# Patient Record
Sex: MALE | Race: WHITE | Employment: OTHER | URBAN - NONMETROPOLITAN AREA
[De-identification: names, ages, dates, MRNs, and addresses within clinical notes are randomized per-mention and may not be internally consistent; named-entity substitution may affect disease eponyms.]

---

## 2018-09-11 ENCOUNTER — APPOINTMENT (OUTPATIENT)
Dept: CT IMAGING | Facility: HOSPITAL | Age: 63
End: 2018-09-11
Attending: FAMILY MEDICINE
Payer: COMMERCIAL

## 2018-09-11 ENCOUNTER — HOSPITAL ENCOUNTER (EMERGENCY)
Facility: HOSPITAL | Age: 63
Discharge: HOME | End: 2018-09-11
Attending: FAMILY MEDICINE | Admitting: FAMILY MEDICINE
Payer: COMMERCIAL

## 2018-09-11 ENCOUNTER — APPOINTMENT (OUTPATIENT)
Dept: MRI IMAGING | Facility: HOSPITAL | Age: 63
End: 2018-09-11
Attending: FAMILY MEDICINE
Payer: COMMERCIAL

## 2018-09-11 VITALS
RESPIRATION RATE: 18 BRPM | TEMPERATURE: 98 F | HEART RATE: 70 BPM | SYSTOLIC BLOOD PRESSURE: 156 MMHG | WEIGHT: 230 LBS | DIASTOLIC BLOOD PRESSURE: 100 MMHG | OXYGEN SATURATION: 96 %

## 2018-09-11 DIAGNOSIS — D32.0 BENIGN NEOPLASM OF CEREBRAL MENINGES (H): ICD-10-CM

## 2018-09-11 DIAGNOSIS — R42 VERTIGO: ICD-10-CM

## 2018-09-11 LAB
ANION GAP SERPL CALCULATED.3IONS-SCNC: 4 MMOL/L (ref 3–14)
BASOPHILS # BLD AUTO: 0 10E9/L (ref 0–0.2)
BASOPHILS NFR BLD AUTO: 0.5 %
BUN SERPL-MCNC: 23 MG/DL (ref 7–30)
CALCIUM SERPL-MCNC: 8.6 MG/DL (ref 8.5–10.1)
CHLORIDE SERPL-SCNC: 105 MMOL/L (ref 94–109)
CO2 SERPL-SCNC: 27 MMOL/L (ref 20–32)
CREAT SERPL-MCNC: 0.95 MG/DL (ref 0.66–1.25)
DIFFERENTIAL METHOD BLD: NORMAL
EOSINOPHIL # BLD AUTO: 0.2 10E9/L (ref 0–0.7)
EOSINOPHIL NFR BLD AUTO: 2.4 %
ERYTHROCYTE [DISTWIDTH] IN BLOOD BY AUTOMATED COUNT: 12.8 % (ref 10–15)
GFR SERPL CREATININE-BSD FRML MDRD: 80 ML/MIN/1.7M2
GLUCOSE SERPL-MCNC: 100 MG/DL (ref 70–99)
HCT VFR BLD AUTO: 47.3 % (ref 40–53)
HGB BLD-MCNC: 16.2 G/DL (ref 13.3–17.7)
IMM GRANULOCYTES # BLD: 0.1 10E9/L (ref 0–0.4)
IMM GRANULOCYTES NFR BLD: 0.9 %
LYMPHOCYTES # BLD AUTO: 1.4 10E9/L (ref 0.8–5.3)
LYMPHOCYTES NFR BLD AUTO: 22.7 %
MCH RBC QN AUTO: 29.7 PG (ref 26.5–33)
MCHC RBC AUTO-ENTMCNC: 34.2 G/DL (ref 31.5–36.5)
MCV RBC AUTO: 87 FL (ref 78–100)
MONOCYTES # BLD AUTO: 0.5 10E9/L (ref 0–1.3)
MONOCYTES NFR BLD AUTO: 8.2 %
NEUTROPHILS # BLD AUTO: 4.1 10E9/L (ref 1.6–8.3)
NEUTROPHILS NFR BLD AUTO: 65.3 %
NRBC # BLD AUTO: 0 10*3/UL
NRBC BLD AUTO-RTO: 0 /100
PLATELET # BLD AUTO: 212 10E9/L (ref 150–450)
POTASSIUM SERPL-SCNC: 4.1 MMOL/L (ref 3.4–5.3)
RBC # BLD AUTO: 5.45 10E12/L (ref 4.4–5.9)
SODIUM SERPL-SCNC: 136 MMOL/L (ref 133–144)
WBC # BLD AUTO: 6.3 10E9/L (ref 4–11)

## 2018-09-11 PROCEDURE — 70450 CT HEAD/BRAIN W/O DYE: CPT | Mod: TC

## 2018-09-11 PROCEDURE — A9585 GADOBUTROL INJECTION: HCPCS | Performed by: RADIOLOGY

## 2018-09-11 PROCEDURE — 70553 MRI BRAIN STEM W/O & W/DYE: CPT | Mod: TC

## 2018-09-11 PROCEDURE — 99285 EMERGENCY DEPT VISIT HI MDM: CPT | Mod: 25

## 2018-09-11 PROCEDURE — 99285 EMERGENCY DEPT VISIT HI MDM: CPT | Performed by: FAMILY MEDICINE

## 2018-09-11 PROCEDURE — 36415 COLL VENOUS BLD VENIPUNCTURE: CPT | Performed by: FAMILY MEDICINE

## 2018-09-11 PROCEDURE — 93005 ELECTROCARDIOGRAM TRACING: CPT

## 2018-09-11 PROCEDURE — 85025 COMPLETE CBC W/AUTO DIFF WBC: CPT | Performed by: FAMILY MEDICINE

## 2018-09-11 PROCEDURE — 80048 BASIC METABOLIC PNL TOTAL CA: CPT | Performed by: FAMILY MEDICINE

## 2018-09-11 PROCEDURE — 93010 ELECTROCARDIOGRAM REPORT: CPT | Performed by: INTERNAL MEDICINE

## 2018-09-11 PROCEDURE — 25500064 ZZH RX 255 OP 636: Performed by: RADIOLOGY

## 2018-09-11 RX ORDER — MULTIPLE VITAMINS W/ MINERALS TAB 9MG-400MCG
1 TAB ORAL DAILY
COMMUNITY

## 2018-09-11 RX ORDER — MECLIZINE HCL 12.5 MG 12.5 MG/1
12.5 TABLET ORAL 4 TIMES DAILY PRN
Qty: 30 TABLET | Refills: 0 | COMMUNITY
Start: 2018-09-11

## 2018-09-11 RX ORDER — GADOBUTROL 604.72 MG/ML
10 INJECTION INTRAVENOUS ONCE
Status: COMPLETED | OUTPATIENT
Start: 2018-09-11 | End: 2018-09-11

## 2018-09-11 RX ADMIN — GADOBUTROL 10 ML: 604.72 INJECTION INTRAVENOUS at 12:50

## 2018-09-11 ASSESSMENT — ENCOUNTER SYMPTOMS
FEVER: 0
PSYCHIATRIC NEGATIVE: 1
DIAPHORESIS: 0
SHORTNESS OF BREATH: 0
DYSURIA: 0
WEAKNESS: 0
DIARRHEA: 0
LIGHT-HEADEDNESS: 1
DIZZINESS: 1
SPEECH DIFFICULTY: 0
CONSTIPATION: 0
NAUSEA: 0
ACTIVITY CHANGE: 0
FATIGUE: 0
VOMITING: 0
ABDOMINAL PAIN: 0
MUSCULOSKELETAL NEGATIVE: 1

## 2018-09-11 NOTE — ED PROVIDER NOTES
"  History     Chief Complaint   Patient presents with     Dizziness     \"on and off since yesterday\"     HPI  Flo Pablo is a 63 year old male who presents the emergency room with complaints of dizziness on and off since yesterday morning when he got up.  He states that it was not spinning initially, just unsteady, but then when he tried to stretch he had some vertiginous movement.  He did fairly well through the day yesterday, occasional spells of the lightheadedness, but nothing constant.  This morning when he woke up it was worse again and he decided he needed to be seen and evaluated.  He is a  and did  game last night and did okay, however he is concerned.  He denies any headache or weakness in his extremities, he has no facial droop, is having no difficulty with speaking or word finding.    Problem List:    There are no active problems to display for this patient.       Past Medical History:    No past medical history on file.    Past Surgical History:    No past surgical history on file.    Family History:    No family history on file.    Social History:  Marital Status:   [2]  Social History   Substance Use Topics     Smoking status: Not on file     Smokeless tobacco: Not on file     Alcohol use Not on file        Medications:      ASPIRIN EC PO   LISINOPRIL PO   meclizine (ANTIVERT) 12.5 MG tablet   multivitamin, therapeutic with minerals (MULTI-VITAMIN) TABS tablet   Naproxen Sodium (ALEVE PO)   NONFORMULARY         Review of Systems   Constitutional: Negative for activity change, diaphoresis, fatigue and fever.   HENT: Negative.    Respiratory: Negative for shortness of breath.    Cardiovascular: Negative for chest pain.   Gastrointestinal: Negative for abdominal pain, constipation, diarrhea, nausea and vomiting.   Genitourinary: Negative for dysuria.   Musculoskeletal: Negative.    Skin: Negative.    Neurological: Positive for dizziness and light-headedness. Negative for " syncope, speech difficulty and weakness.   Psychiatric/Behavioral: Negative.        Physical Exam   BP: 157/87  Pulse: 74  Heart Rate: 67  Temp: 98.1  F (36.7  C)  Resp: 16  Weight: 104.3 kg (230 lb)  SpO2: 96 %  Lying Orthostatic BP: 153/93  Lying Orthostatic Pulse: 70 bpm  Sitting Orthostatic BP: 160/96  Sitting Orthostatic Pulse: 70 bpm  Standing Orthostatic BP: 150/97  Standing Orthostatic Pulse: 74 bpm      Physical Exam   Constitutional: He is oriented to person, place, and time. He appears well-developed and well-nourished. No distress.   HENT:   Head: Normocephalic and atraumatic.   Neck: Normal range of motion. Neck supple.   Cardiovascular: Normal rate, regular rhythm, normal heart sounds and intact distal pulses.    No murmur heard.  Pulmonary/Chest: Effort normal and breath sounds normal. No respiratory distress.   Abdominal: Soft. Bowel sounds are normal. He exhibits no distension. There is no tenderness.   Musculoskeletal: Normal range of motion. He exhibits no edema.   Neurological: He is alert and oriented to person, place, and time. No cranial nerve deficit. He exhibits normal muscle tone. Coordination normal.   Patient did have some reproduction of lightheadedness after going from lying to sitting, but it subsided quickly.   Skin: Skin is warm and dry.   Psychiatric: He has a normal mood and affect.   Nursing note and vitals reviewed.      ED Course     ED Course     Procedures         EKG Interpretation:      Interpreted by Yuliya Sepulveda  Time reviewed: 1055  Symptoms at time of EKG: lightheadedness   Rhythm: normal sinus   Rate: Normal  Axis: Left Axis Deviation  Ectopy: none  Conduction: right bundle branch block (complete)  ST Segments/ T Waves: No ST-T wave changes and No acute ischemic changes  Q Waves: none  Comparison to prior: No old EKG available    Clinical Impression: no acute changes and non-specific EKG    Results for orders placed or performed during the hospital  encounter of 09/11/18 (from the past 24 hour(s))   Basic metabolic panel   Result Value Ref Range    Sodium 136 133 - 144 mmol/L    Potassium 4.1 3.4 - 5.3 mmol/L    Chloride 105 94 - 109 mmol/L    Carbon Dioxide 27 20 - 32 mmol/L    Anion Gap 4 3 - 14 mmol/L    Glucose 100 (H) 70 - 99 mg/dL    Urea Nitrogen 23 7 - 30 mg/dL    Creatinine 0.95 0.66 - 1.25 mg/dL    GFR Estimate 80 >60 mL/min/1.7m2    GFR Estimate If Black >90 >60 mL/min/1.7m2    Calcium 8.6 8.5 - 10.1 mg/dL   CBC with platelets differential   Result Value Ref Range    WBC 6.3 4.0 - 11.0 10e9/L    RBC Count 5.45 4.4 - 5.9 10e12/L    Hemoglobin 16.2 13.3 - 17.7 g/dL    Hematocrit 47.3 40.0 - 53.0 %    MCV 87 78 - 100 fl    MCH 29.7 26.5 - 33.0 pg    MCHC 34.2 31.5 - 36.5 g/dL    RDW 12.8 10.0 - 15.0 %    Platelet Count 212 150 - 450 10e9/L    Diff Method Automated Method     % Neutrophils 65.3 %    % Lymphocytes 22.7 %    % Monocytes 8.2 %    % Eosinophils 2.4 %    % Basophils 0.5 %    % Immature Granulocytes 0.9 %    Nucleated RBCs 0 0 /100    Absolute Neutrophil 4.1 1.6 - 8.3 10e9/L    Absolute Lymphocytes 1.4 0.8 - 5.3 10e9/L    Absolute Monocytes 0.5 0.0 - 1.3 10e9/L    Absolute Eosinophils 0.2 0.0 - 0.7 10e9/L    Absolute Basophils 0.0 0.0 - 0.2 10e9/L    Abs Immature Granulocytes 0.1 0 - 0.4 10e9/L    Absolute Nucleated RBC 0.0    CT Head w/o Contrast    Narrative    PROCEDURE: CT HEAD W/O CONTRAST     HISTORY: vertigo; .    COMPARISON: None.    TECHNIQUE:  Helical images of the head from the foramen magnum to the  vertex were obtained without contrast.    FINDINGS: The ventricles and sulci are normal in volume. No acute  intracranial hemorrhage, mass effect, midline shift, hydrocephalus or  basilar cystern effacement are present.    The grey-white matter interface is preserved.    The calvarium is intact. The mastoid air cells are clear.  The  visualized paranasal sinuses are clear.      Impression    IMPRESSION: Normal brain      TONY  MD CELSO   MR Brain w/o & w Contrast    Narrative    MR BRAIN W/O & W CONTRAST  9/11/2018 1:09 PM    History: Male, age 63 years, intermittent dizziness x2 days;     Comparison: Head CT 9/11/2018    Technique: Sagittal T1, axial T2, T2 FLAIR, diffusion, gradient echo,  ADC mapping, T1 and 3 plane T1 fat saturation postcontrast 3-D. .    Findings:   Ventricles and sulci: Normal in size and shape.    Gray and white matter: Normal.    Cerebellopontine angles: Clear    Extra-axial spaces: There is a well-circumscribed, robustly enhancing  extra-axial mass in the left frontal region measuring 2.75 x 2.1 x 1.9  cm.    Enhancement: 2.7 x 2.1 x 1.9 cm robustly enhancing left frontal  extra-axial mass, likely representing a meningioma.    Midline structures: Normal in contour.  Globes: Normal.  Orbits: Normal. Intraconal and extraconal fat unremarkable.  Extraocular muscles: Normal.  Paranasal sinuses: Normal.  Mastoid air cells: Normal.  Diffusion: Normal.      Impression    Impression:  1.  2.7 x 2.1 x 1.9 cm left frontal extra-axial neoplasm, likely  representing a meningioma. There is no evidence to suggest any type of  acute complication related to mass effect or other pathologic process.  No evidence of acute or subacute ischemia.      These results were discussed with Dr. Adonis Sepulveda on 9/11/2018 at  1317 hours.    NANCY RESENDIZ MD       Medications   gadobutrol (GADAVIST) injection 10 mL (10 mLs Intravenous Given 9/11/18 1250)   sodium chloride (PF) 0.9% PF flush 5 mL (5 mLs Intravenous Given 9/11/18 1251)       Assessments & Plan (with Medical Decision Making)   Patient has no evidence of ischemia - acute or chronic.  There is an incidental finding of what appears to be a meningioma in the left frontal lobe, extra-axial.  It is 2.7 x 2.1 x 1.9 cm, not likely to be the cause of his dizziness.  He was informed of this finding, knows he should follow-up with Dr. Magaña about it for further evaluation.   Message left for Dr. Magaña with Dr. Augustin is o ner so that he has the information as well.  Patient given meclizine as a suggestion for medication if needed for the dizziness.  Follow-up with Dr. Magaña as soon as possible.    I have reviewed the nursing notes.    I have reviewed the findings, diagnosis, plan and need for follow up with the patient.  New Prescriptions    MECLIZINE (ANTIVERT) 12.5 MG TABLET    Take 1 tablet (12.5 mg) by mouth 4 times daily as needed for dizziness       Final diagnoses:   Vertigo   Benign neoplasm of cerebral meninges (H)       9/11/2018   [facility]     Yuliya Morales MD  09/11/18 2761

## 2018-09-11 NOTE — ED NOTES
Discharge papers given to pt.  Verbalizes understanding of discharge diagnosis, make a follow up appt with PC.  Denies pain.  VS as charted.  Discharged to home with steady gait.

## 2018-09-11 NOTE — DISCHARGE INSTRUCTIONS
Dizziness (Vertigo) and Balance Problems: Staying Safe     Replace burned-out light bulbs to keep your home safe and well lit.     Falls or accidents can lead to pain, broken bones, and fear of future falls. Protect yourself and others by preparing for episodes. Simple steps can help you stay safe at home and wherever you go.  Lighting  Keep all areas well lit. This helps your eyes send the right signals to the brain. It also makes you less likely to trip and fall. If bright lights make symptoms worse, dim the lights or lie in a dark room until the dizziness passes. Then turn the lights back to their normal level.  Tips:    Keep a flashlight by the bed.    Place nightlights in bathrooms and hallways.    Replace burned-out bulbs, or have someone replace them for you.  Preventing falls  To reduce your risk of falling:    Get out of bed or up from a chair slowly.    Wear low-heeled shoes that fit properly and have slip-resistant soles.    Remove throw rugs. Clear clutter from walkways.    Use handrails on stairs. Have handrails installed or adjusted if needed.    Install grab bars in the bathroom. Don't use towel racks for balance.    Use a shower stool. Also put adhesive strips in the shower or on the tub floor.  Going out  With a little time and preparation, you can get around safely.  Tips:    Bring a cane or walking aid if needed.    Give yourself plenty of time in case you start to get dizzy.    Ask your healthcare provider what type of exercise is safe for your condition.    Be patient. If an activity such as walking through a crowded shop causes you stress, you may not be ready for it yet.  Driving  If you become dizzy or disoriented while driving, you could hurt yourself and others. That's why it's best to not drive until symptoms have gone away. In some cases, your license may be temporarily held until it's safe for you to drive again.  For safety:    Ask a friend to drive for you.    Take public  transportation.    Walk to stores and other places when you can.  Asking for help  Don't be afraid to ask for help running errands, cooking meals, and doing exercise. Whether it's a friend, loved one, neighbor, or stranger on the street, a little help can make a world of difference.   Date Last Reviewed: 2016-2017 [employer]. [address] [place] [address], [place] [address]. All rights reserved. This information is not intended as a substitute for professional medical care. Always follow your healthcare professional's instructions.      MRI Contrast Discharge Instructions    The IV contrast you received today will pass out of your body in your  urine. This will happen in the next 24 hours. You will not feel this process.  Your urine will not change color.    Drink at least 4 extra glasses of water or juice today (unless your doctor  has restricted your fluids). This reduces the stress on your kidneys.  You may take your regular medicines.    If you are on dialysis: It is best to have dialysis today.    If you have a reaction: Most reactions happen right away. If you have  any new symptoms after leaving the hospital (such as hives or swelling),  call your hospital at the correct number below. Or call your family doctor.  If you have breathing distress or wheezing, call 911.    Special instructions: none    I have read and understand the above information.    Signature:______________________________________ Date:____6-47-86_______    Staff:__________________________________________ Date:___________     Time:__________    [facility] Dep[facility]:    ___[facility]: [phone]  ___[facility]: [phone]  ___[place]: [phone] ___[facility]: [phone]  ___[facility]: [phone]  ___[facility]: [phone]  ___[phone]  ___[facility]: [phone]  ___[phone]

## 2018-09-11 NOTE — ED AVS SNAPSHOT
[facility]    [address] [place] [address]    Phone:  [phone]                                       Flo Pablo   MRN: [id number]    Department:  [facility]   Date of Visit:  9/11/2018           Patient Information     Date Of Birth          1955        Your diagnoses for this visit were:     Vertigo     Benign neoplasm of cerebral meninges (H)        You were seen by Yuliya Morales MD.      Follow-up Information     Follow up with Keith Magaña DO In 3 days.    Specialty:  Family Practice    Why:  Follow up ED visit    Contact information:    [facility]  [address] [facility] [address]  [phone]          Discharge Instructions           Dizziness (Vertigo) and Balance Problems: Staying Safe     Replace burned-out light bulbs to keep your home safe and well lit.     Falls or accidents can lead to pain, broken bones, and fear of future falls. Protect yourself and others by preparing for episodes. Simple steps can help you stay safe at home and wherever you go.  Lighting  Keep all areas well lit. This helps your eyes send the right signals to the brain. It also makes you less likely to trip and fall. If bright lights make symptoms worse, dim the lights or lie in a dark room until the dizziness passes. Then turn the lights back to their normal level.  Tips:    Keep a flashlight by the bed.    Place nightlights in bathrooms and hallways.    Replace burned-out bulbs, or have someone replace them for you.  Preventing falls  To reduce your risk of falling:    Get out of bed or up from a chair slowly.    Wear low-heeled shoes that fit properly and have slip-resistant soles.    Remove throw rugs. Clear clutter from walkways.    Use handrails on stairs. Have handrails installed or adjusted if needed.    Install grab bars in the bathroom. Don't use towel racks for balance.    Use a shower stool. Also put adhesive strips in the  shower or on the tub floor.  Going out  With a little time and preparation, you can get around safely.  Tips:    Bring a cane or walking aid if needed.    Give yourself plenty of time in case you start to get dizzy.    Ask your healthcare provider what type of exercise is safe for your condition.    Be patient. If an activity such as walking through a crowded shop causes you stress, you may not be ready for it yet.  Driving  If you become dizzy or disoriented while driving, you could hurt yourself and others. That's why it's best to not drive until symptoms have gone away. In some cases, your license may be temporarily held until it's safe for you to drive again.  For safety:    Ask a friend to drive for you.    Take public transportation.    Walk to stores and other places when you can.  Asking for help  Don't be afraid to ask for help running errands, cooking meals, and doing exercise. Whether it's a friend, loved one, neighbor, or stranger on the street, a little help can make a world of difference.   Date Last Reviewed: 11/1/2016 2000-2017 [employer]. [address] [place] [address]. All rights reserved. This information is not intended as a substitute for professional medical care. Always follow your healthcare professional's instructions.      MRI Contrast Discharge Instructions    The IV contrast you received today will pass out of your body in your  urine. This will happen in the next 24 hours. You will not feel this process.  Your urine will not change color.    Drink at least 4 extra glasses of water or juice today (unless your doctor  has restricted your fluids). This reduces the stress on your kidneys.  You may take your regular medicines.    If you are on dialysis: It is best to have dialysis today.    If you have a reaction: Most reactions happen right away. If you have  any new symptoms after leaving the hospital (such as hives or swelling),  call your hospital at the  correct number below. Or call your family doctor.  If you have breathing distress or wheezing, call 911.    Special instructions: none    I have read and understand the above information.    Signature:______________________________________ Date:____8-33-30_______    Staff:__________________________________________ Date:___________     Time:__________    [facility] Departments:    ___[facility]: [phone]  ___[facility]: [phone]  ___[place]: [phone] ___[facility]: [phone]  ___[facility]: [phone]  ___[facility]: [phone]  ___[phone]  ___[facility]: [phone]  ___[phone]       Review of your medicines      START taking        Dose / Directions Last dose taken    meclizine 12.5 MG tablet   Commonly known as:  ANTIVERT   Dose:  12.5 mg   Quantity:  30 tablet        Take 1 tablet (12.5 mg) by mouth 4 times daily as needed for dizziness   Refills:  0          Our records show that you are taking the medicines listed below. If these are incorrect, please call your family doctor or clinic.        Dose / Directions Last dose taken    ALEVE PO   Dose:  220 mg        Take 220 mg by mouth   Refills:  0        ASPIRIN EC PO   Dose:  81 mg        Take 81 mg by mouth   Refills:  0        LISINOPRIL PO   Dose:  20 mg        Take 20 mg by mouth   Refills:  0        Multi-vitamin Tabs tablet   Dose:  1 tablet        Take 1 tablet by mouth daily   Refills:  0        NONFORMULARY        Glucosamine chondroitin   Refills:  0                Prescriptions were sent or printed at these locations (1 Prescription)                   Other Prescriptions                Not Printed or Sent (1 of 1)         meclizine (ANTIVERT) 12.5 MG tablet                Procedures and tests performed during your visit     Basic metabolic panel    CBC with platelets differential    CT Head w/o Contrast    EKG 12-lead, tracing only    MR Brain w/o & w Contrast    Orthostatic blood pressure  "and pulse      Orders Needing Specimen Collection     Ordered          18 1043  UA reflex to Microscopic and Culture - STAT, [facility]: STAT, Status: Sent     Scheduled Task Status   18 1043 [facility] Reminder: Open   Order Class:  PCU Collect                  Pending Results     No orders found from 2018 to 2018.            Pending Culture Results     No orders found from 2018 to 2018.            Thank you for choosing [facility]       Thank you for choosing [facility] for your care. Our goal is always to provide you with excellent care. Hearing back from our patients is one way we can continue to improve our services. Please take a few minutes to complete the written survey that you may receive in the mail after you visit with us. Thank you!        [employer] Information     [facility] lets you send messages to your doctor, view your test results, renew your prescriptions, schedule appointments and more. To sign up, go to [email or web address] . Click on \"Log in\" on the left side of the screen, which will take you to the Welcome page. Then click on \"Sign up Now\" on the right side of the page.     You will be asked to enter the access code listed below, as well as some personal information. Please follow the directions to create your username and password.     Your access code is: [email or web address]  Expires: 12/10/2018  2:22 PM     Your access code will  in 90 days. If you need help or a new code, please call your [facility] clinic or [phone].        Care EveryWhere ID     This is your Care EveryWhere ID. This could be used by other organizations to access your [facility] medical records  [id number]        Equal Access to Services     [facility] DESI : Hadii ashlie Lazo, wasahra manuel, qavinayak caro. So [facility] [phone].    ATENCIÓN: Si habla español, tiene a woods disposición servicios gratuitos de asistencia lingüística. Llame " al [phone].    We comply with applicable federal civil rights laws and [place] laws. We do not discriminate on the basis of race, color, national origin, age, disability, sex, sexual orientation, or gender identity.            After Visit Summary       This is your record. Keep this with you and show to your community pharmacist(s) and doctor(s) at your next visit.

## 2018-09-11 NOTE — ED NOTES
Presents to the ED alone with c/o dizziness starting yesterday morning when he woke up  States he was doing his exercises and felt unbalanced. 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  Reports s/sx happened again this morning.  States it's intermittent.  Worse with movement and feels foggy at times.  Denies resp illness.  Pt denies falls or hitting head.  Assessment is complete.  Monitors on pt. Call light is given.

## 2018-09-11 NOTE — ED AVS SNAPSHOT
[facility]    [address] [place] [address]    Phone:  [phone]                                       Flo Pablo   MRN: [id number]    Department:  [facility]   Date of Visit:  9/11/2018           After Visit Summary Signature Page     I have received my discharge instructions, and my questions have been answered. I have discussed any challenges I see with this plan with the nurse or doctor.    ..........................................................................................................................................  Patient/Patient Representative Signature      ..........................................................................................................................................  Patient Representative Print Name and Relationship to Patient    ..................................................               ................................................  Date                                   Time    ..........................................................................................................................................  Reviewed by Signature/Title    ...................................................              ..............................................  Date                                               Time          22EPIC Rev 08/18

## 2018-11-13 ENCOUNTER — HOSPITAL ENCOUNTER (EMERGENCY)
Facility: HOSPITAL | Age: 63
Discharge: HOME | End: 2018-11-13
Attending: PHYSICIAN ASSISTANT | Admitting: PHYSICIAN ASSISTANT
Payer: COMMERCIAL

## 2018-11-13 VITALS
SYSTOLIC BLOOD PRESSURE: 147 MMHG | TEMPERATURE: 98.6 F | OXYGEN SATURATION: 97 % | RESPIRATION RATE: 14 BRPM | DIASTOLIC BLOOD PRESSURE: 83 MMHG

## 2018-11-13 DIAGNOSIS — J20.8 ACUTE BRONCHITIS DUE TO OTHER SPECIFIED ORGANISMS: ICD-10-CM

## 2018-11-13 DIAGNOSIS — J01.00 ACUTE MAXILLARY SINUSITIS, RECURRENCE NOT SPECIFIED: ICD-10-CM

## 2018-11-13 LAB
DEPRECATED S PYO AG THROAT QL EIA: NORMAL
SPECIMEN SOURCE: NORMAL

## 2018-11-13 PROCEDURE — 87081 CULTURE SCREEN ONLY: CPT | Performed by: FAMILY MEDICINE

## 2018-11-13 PROCEDURE — 99203 OFFICE O/P NEW LOW 30 MIN: CPT | Performed by: PHYSICIAN ASSISTANT

## 2018-11-13 PROCEDURE — 87880 STREP A ASSAY W/OPTIC: CPT | Performed by: FAMILY MEDICINE

## 2018-11-13 PROCEDURE — G0463 HOSPITAL OUTPT CLINIC VISIT: HCPCS

## 2018-11-13 ASSESSMENT — ENCOUNTER SYMPTOMS
SINUS PRESSURE: 1
VOMITING: 0
SORE THROAT: 1
ABDOMINAL PAIN: 0
FATIGUE: 1
FEVER: 0
TROUBLE SWALLOWING: 0
NECK PAIN: 0
HEADACHES: 0
EYE DISCHARGE: 0
APPETITE CHANGE: 0
DIARRHEA: 0
COUGH: 1
PSYCHIATRIC NEGATIVE: 1
NAUSEA: 0
NECK STIFFNESS: 0
LIGHT-HEADEDNESS: 0
CARDIOVASCULAR NEGATIVE: 1
EYE REDNESS: 0
VOICE CHANGE: 0
SINUS PAIN: 1
DIZZINESS: 0

## 2018-11-13 NOTE — ED AVS SNAPSHOT
[facility]    [address] [place] [address]    Phone:  [phone]                                       Flo Pablo   MRN: [id number]    Department:  [facility]   Date of Visit:  11/13/2018           After Visit Summary Signature Page     I have received my discharge instructions, and my questions have been answered. I have discussed any challenges I see with this plan with the nurse or doctor.    ..........................................................................................................................................  Patient/Patient Representative Signature      ..........................................................................................................................................  Patient Representative Print Name and Relationship to Patient    ..................................................               ................................................  Date                                   Time    ..........................................................................................................................................  Reviewed by Signature/Title    ...................................................              ..............................................  Date                                               Time          22EPIC Rev 08/18

## 2018-11-13 NOTE — ED AVS SNAPSHOT
[facility]    [address] [address]    Phone:  [phone]                                       Flo Pablo   MRN: [id number]    Department:  [facility]   Date of Visit:  11/13/2018           Patient Information     Date Of Birth          1955        Your diagnoses for this visit were:     Acute maxillary sinusitis, recurrence not specified     Acute bronchitis due to other specified organisms        You were seen by Julieta Singh PA.      Follow-up Information     Follow up with Keith Magaña DO.    Specialty:  Family Practice    Why:  If symptoms worsen    Contact information:    [facility]  [address]  [facility] [address] [phone]          Follow up with [facility].    Specialty:  EMERGENCY MEDICINE    Why:  If further concerns develop    Contact information:    [address] [address] [phone]    Additional information:    From [place]: Take [address]. Turn left at [address]. Turn left at the first stoplight on [address]. At the first stop sign, take a right onto [address]. Take a left into the parking lot and continue through until you reach the North enterance of the building.       From [place]: Take [address]. Take the [address] ramp towards [place]. Turn left onto [address]. Take a slight right onto [address]. Turn left at the first stoplight on [address]. At the first stop sign, take a right onto [address]. Take a left into the parking lot and continue through until you reach the North enterance of the building.       From [place]: Take [address]. Take a right at [address]. At the first stop sign, take a right onto [address]. Take a left into the parking lot and continue through until you reach the North enterance of the building.       Discharge  References/Attachments     SINUSITIS (ANTIBIOTIC TREATMENT) (ENGLISH)    BRONCHITIS, ANTIBIOTIC TREATMENT (ADULT) (ENGLISH)         Review of your medicines      START taking        Dose / Directions Last dose taken    amoxicillin-clavulanate 875-125 MG per tablet   Commonly known as:  AUGMENTIN   Dose:  1 tablet   Quantity:  20 tablet        Take 1 tablet by mouth 2 times daily   Refills:  0          Our records show that you are taking the medicines listed below. If these are incorrect, please call your family doctor or clinic.        Dose / Directions Last dose taken    ALEVE PO   Dose:  220 mg        Take 220 mg by mouth   Refills:  0        ASPIRIN EC PO   Dose:  81 mg        Take 81 mg by mouth   Refills:  0        LISINOPRIL PO   Dose:  20 mg        Take 20 mg by mouth   Refills:  0        meclizine 12.5 MG tablet   Commonly known as:  ANTIVERT   Dose:  12.5 mg   Quantity:  30 tablet        Take 1 tablet (12.5 mg) by mouth 4 times daily as needed for dizziness   Refills:  0        Multi-vitamin Tabs tablet   Dose:  1 tablet        Take 1 tablet by mouth daily   Refills:  0        NONFORMULARY        Glucosamine chondroitin   Refills:  0                Prescriptions were sent or printed at these locations (1 Prescription)                   [facility] - [place], [place] - [address] AT [facility] [address]   [address], [place] [address]    Telephone:  [phone]   Fax:  [phone]   Hours:                  E-Prescribed (1 of 1)         amoxicillin-clavulanate (AUGMENTIN) 875-125 MG per tablet                Procedures and tests performed during your visit     Beta strep group A culture    Rapid strep screen      Orders Needing Specimen Collection     None      Pending Results     Date and Time Order Name Status Description    11/13/2018 1427 Beta strep group A culture In process             Pending Culture Results     Date and Time Order Name Status Description    11/13/2018 1427  "Beta strep group A culture In process             Thank you for choosing [facility]       Thank you for choosing [facility] for your care. Our goal is always to provide you with excellent care. Hearing back from our patients is one way we can continue to improve our services. Please take a few minutes to complete the written survey that you may receive in the mail after you visit with us. Thank you!        [employer] Information     [email or web address] lets you send messages to your doctor, view your test results, renew your prescriptions, schedule appointments and more. To sign up, go to [email or web address] . Click on \"Log in\" on the left side of the screen, which will take you to the Welcome page. Then click on \"Sign up Now\" on the right side of the page.     You will be asked to enter the access code listed below, as well as some personal information. Please follow the directions to create your username and password.     Your access code is: [email or web address]  Expires: 12/10/2018  1:22 PM     Your access code will  in 90 days. If you need help or a new code, please call your [facility] clinic or [phone].        Care EveryWhere ID     This is your Care EveryWhere ID. This could be used by other organizations to access your [facility] medical records  [id number]        Equal Access to Services     MAYITO WEEKS : Lisbeth Lazo, waaxda luqadaha, qaybta kaalmada adeshivyada, vinayak ferguson. So [facility] [phone].    ATENCIÓN: Si habla español, tiene a woods disposición servicios gratuitos de asistencia lingüística. Llame al [phone].    We comply with applicable federal civil rights laws and [place] laws. We do not discriminate on the basis of race, color, national origin, age, disability, sex, sexual orientation, or gender identity.            After Visit Summary       This is your record. Keep this with you and show to your community pharmacist(s) and doctor(s) at your next visit.  "

## 2018-11-13 NOTE — ED PROVIDER NOTES
History     Chief Complaint   Patient presents with     Cough     c/o cough and sore throat     The history is provided by the patient. No  was used.     Flo Pablo is a 63 year old male who has over 2 weeks of cough/congestion, sore throat and decreased energy. No n/v/d/f/c. No rash. No change in b/b habits. Has maxillary sinus pain/pressure.      Past Medical History:    History reviewed. No pertinent past medical history.    Past Surgical History:    History reviewed. No pertinent surgical history.    Family History:    No family history on file.    Social History:  Marital Status:   [2]  Social History   Substance Use Topics     Smoking status: Not on file     Smokeless tobacco: Not on file     Alcohol use Not on file        Medications:      amoxicillin-clavulanate (AUGMENTIN) 875-125 MG per tablet   ASPIRIN EC PO   LISINOPRIL PO   meclizine (ANTIVERT) 12.5 MG tablet   multivitamin, therapeutic with minerals (MULTI-VITAMIN) TABS tablet   Naproxen Sodium (ALEVE PO)   NONFORMULARY         Review of Systems   Constitutional: Positive for fatigue. Negative for appetite change and fever.   HENT: Positive for congestion, sinus pain, sinus pressure and sore throat. Negative for ear pain, trouble swallowing and voice change.    Eyes: Negative for discharge and redness.   Respiratory: Positive for cough.    Cardiovascular: Negative.    Gastrointestinal: Negative for abdominal pain, diarrhea, nausea and vomiting.   Genitourinary: Negative.    Musculoskeletal: Negative for neck pain and neck stiffness.   Skin: Negative for rash.   Neurological: Negative for dizziness, light-headedness and headaches.   Psychiatric/Behavioral: Negative.        Physical Exam   BP: 147/83  Heart Rate: 81  Temp: 98.6  F (37  C)  Resp: 14  SpO2: 97 %      Physical Exam   Constitutional: He is oriented to person, place, and time. He appears well-developed and well-nourished. No distress.   HENT:   Head:  Normocephalic and atraumatic.   Right Ear: External ear normal.   Left Ear: External ear normal.   Mouth/Throat: Oropharynx is clear and moist.   Bilateral TMs/canals clear/wnl  maxillary sinus TTP     Eyes: Conjunctivae and EOM are normal. Right eye exhibits no discharge. Left eye exhibits no discharge.   Neck: Normal range of motion. Neck supple.   Cardiovascular: Normal rate, regular rhythm and normal heart sounds.    Pulmonary/Chest: Effort normal and breath sounds normal. No respiratory distress.   Abdominal: Soft. Bowel sounds are normal. He exhibits no distension. There is no tenderness.   Neurological: He is alert and oriented to person, place, and time.   Skin: Skin is warm and dry. No rash noted. He is not diaphoretic.   Psychiatric: He has a normal mood and affect.   Nursing note and vitals reviewed.      ED Course     ED Course     Procedures              Results for orders placed or performed during the hospital encounter of 11/13/18 (from the past 24 hour(s))   Rapid strep screen   Result Value Ref Range    Specimen Description Throat     Rapid Strep A Screen       NEGATIVE: No Group A streptococcal antigen detected by immunoassay, await culture report.       Medications - No data to display    Assessments & Plan (with Medical Decision Making)     I have reviewed the nursing notes.    I have reviewed the findings, diagnosis, plan and need for follow up with the patient.      New Prescriptions    AMOXICILLIN-CLAVULANATE (AUGMENTIN) 875-125 MG PER TABLET    Take 1 tablet by mouth 2 times daily       Final diagnoses:   Acute maxillary sinusitis, recurrence not specified   Acute bronchitis due to other specified organisms       Patient verbally educated and given appropriate education sheets for each of the diagnoses and has no questions.  Take OTC motrin or tylenol as directed on the bottle as needed.  Take prescription medications as directed.  Increase fluids, wash hands often.  Sleep in a recliner or  with multiple pillows until this has resolved.  Follow up with your provider if symptoms increase or if further concerns develop, return to the ER.  Julieta Singh Certified   Physician Assistant  11/13/2018  2:59 PM  URGENT CARE CLINIC    11/13/2018   [facility]     Julieta Singh PA  11/13/18 5298

## 2018-11-15 LAB
BACTERIA SPEC CULT: NORMAL
SPECIMEN SOURCE: NORMAL

## 2019-03-27 ENCOUNTER — HOSPITAL ENCOUNTER (OUTPATIENT)
Dept: MRI IMAGING | Facility: HOSPITAL | Age: 64
Discharge: HOME | End: 2019-03-27
Attending: SPECIALIST | Admitting: SPECIALIST
Payer: COMMERCIAL

## 2019-03-27 DIAGNOSIS — M75.100 ROTATOR CUFF TEAR: ICD-10-CM

## 2019-03-27 PROCEDURE — 73221 MRI JOINT UPR EXTREM W/O DYE: CPT | Mod: TC,RT

## 2019-07-22 ENCOUNTER — HOSPITAL ENCOUNTER (OUTPATIENT)
Dept: MRI IMAGING | Facility: HOSPITAL | Age: 64
Discharge: HOME | End: 2019-07-22
Attending: FAMILY MEDICINE | Admitting: FAMILY MEDICINE
Payer: COMMERCIAL

## 2019-07-22 DIAGNOSIS — D32.9 BENIGN NEOPLASM OF MENINGES, UNSPECIFIED (H): ICD-10-CM

## 2019-07-22 PROCEDURE — 25500064 ZZH RX 255 OP 636: Performed by: RADIOLOGY

## 2019-07-22 PROCEDURE — 70553 MRI BRAIN STEM W/O & W/DYE: CPT | Mod: TC

## 2019-07-22 PROCEDURE — A9585 GADOBUTROL INJECTION: HCPCS | Performed by: RADIOLOGY

## 2019-07-22 RX ORDER — GADOBUTROL 604.72 MG/ML
10 INJECTION INTRAVENOUS ONCE
Status: COMPLETED | OUTPATIENT
Start: 2019-07-22 | End: 2019-07-22

## 2019-07-22 RX ADMIN — GADOBUTROL 10 ML: 604.72 INJECTION INTRAVENOUS at 13:19

## 2020-03-09 ENCOUNTER — HOSPITAL ENCOUNTER (OUTPATIENT)
Dept: CT IMAGING | Facility: HOSPITAL | Age: 65
Discharge: HOME | End: 2020-03-09
Attending: FAMILY MEDICINE | Admitting: FAMILY MEDICINE
Payer: COMMERCIAL

## 2020-03-09 DIAGNOSIS — Z98.890 OTHER SPECIFIED POSTPROCEDURAL STATES: ICD-10-CM

## 2020-03-09 DIAGNOSIS — R06.02 SOB (SHORTNESS OF BREATH): ICD-10-CM

## 2020-03-09 LAB — RADIOLOGIST FLAGS: ABNORMAL

## 2020-03-09 PROCEDURE — 25500064 ZZH RX 255 OP 636: Performed by: RADIOLOGY

## 2020-03-09 PROCEDURE — 71275 CT ANGIOGRAPHY CHEST: CPT | Mod: TC

## 2020-03-09 RX ADMIN — IOHEXOL 75 ML: 350 INJECTION, SOLUTION INTRAVENOUS at 14:29

## 2020-05-13 ENCOUNTER — MEDICAL CORRESPONDENCE (OUTPATIENT)
Dept: MRI IMAGING | Facility: HOSPITAL | Age: 65
End: 2020-05-13

## 2020-05-15 ENCOUNTER — HOSPITAL ENCOUNTER (OUTPATIENT)
Dept: MRI IMAGING | Facility: HOSPITAL | Age: 65
Discharge: HOME | End: 2020-05-15
Attending: FAMILY MEDICINE | Admitting: FAMILY MEDICINE
Payer: COMMERCIAL

## 2020-05-15 DIAGNOSIS — Z98.890 OTHER SPECIFIED POSTPROCEDURAL STATES: ICD-10-CM

## 2020-05-15 DIAGNOSIS — G93.0 CEREBRAL CYSTS: ICD-10-CM

## 2020-05-15 PROCEDURE — 70553 MRI BRAIN STEM W/O & W/DYE: CPT | Mod: TC

## 2020-05-15 PROCEDURE — 25500064 ZZH RX 255 OP 636: Performed by: RADIOLOGY

## 2020-05-15 PROCEDURE — A9585 GADOBUTROL INJECTION: HCPCS | Performed by: RADIOLOGY

## 2020-05-15 RX ORDER — GADOBUTROL 604.72 MG/ML
10 INJECTION INTRAVENOUS ONCE
Status: COMPLETED | OUTPATIENT
Start: 2020-05-15 | End: 2020-05-15

## 2020-05-15 RX ADMIN — GADOBUTROL 10 ML: 604.72 INJECTION INTRAVENOUS at 08:54
